# Patient Record
Sex: FEMALE
[De-identification: names, ages, dates, MRNs, and addresses within clinical notes are randomized per-mention and may not be internally consistent; named-entity substitution may affect disease eponyms.]

---

## 2022-12-28 ENCOUNTER — DASHBOARD ENCOUNTERS (OUTPATIENT)
Age: 42
End: 2022-12-28

## 2022-12-29 ENCOUNTER — OFFICE VISIT (OUTPATIENT)
Dept: URBAN - METROPOLITAN AREA CLINIC 84 | Facility: CLINIC | Age: 42
End: 2022-12-29

## 2022-12-29 RX ORDER — ONDANSETRON HYDROCHLORIDE 4 MG/1
TAKE 1 TABLET BY MOUTH EVERY 6 HOURS AS NEEDED TABLET, FILM COATED ORAL
Qty: 12 EACH | Refills: 0 | Status: ACTIVE | COMMUNITY

## 2022-12-29 RX ORDER — BLOOD-GLUCOSE METER
USE AS DIRECTED ONCE DAILY EACH MISCELLANEOUS
Qty: 1 EACH | Refills: 0 | Status: ACTIVE | COMMUNITY

## 2022-12-29 RX ORDER — CIPROFLOXACIN HYDROCHLORIDE 500 MG/1
TABLET, FILM COATED ORAL
Qty: 14 TABLET | Status: ACTIVE | COMMUNITY

## 2022-12-29 RX ORDER — INSULIN GLARGINE 100 [IU]/ML
INJECTION, SOLUTION SUBCUTANEOUS
Qty: 10 MILLILITER | Status: ACTIVE | COMMUNITY

## 2022-12-29 RX ORDER — LISINOPRIL 20 MG/1
TABLET ORAL
Qty: 30 TABLET | Status: ACTIVE | COMMUNITY

## 2022-12-29 RX ORDER — BLOOD SUGAR DIAGNOSTIC
USE AS DIRECTED ONCE DAILY STRIP MISCELLANEOUS
Qty: 50 EACH | Refills: 0 | Status: ACTIVE | COMMUNITY

## 2022-12-29 RX ORDER — LANCETS
USE AS DIRECTED ONCE DAILY EACH MISCELLANEOUS
Qty: 102 EACH | Refills: 0 | Status: ACTIVE | COMMUNITY

## 2024-12-02 ENCOUNTER — P2P PATIENT RECORD (OUTPATIENT)
Age: 44
End: 2024-12-02

## 2025-03-17 ENCOUNTER — OFFICE VISIT (OUTPATIENT)
Dept: URBAN - METROPOLITAN AREA CLINIC 27 | Facility: CLINIC | Age: 45
End: 2025-03-17
Payer: COMMERCIAL

## 2025-03-17 DIAGNOSIS — E11.9 TYPE 2 DIABETES MELLITUS WITHOUT COMPLICATION, WITHOUT LONG-TERM CURRENT USE OF INSULIN: ICD-10-CM

## 2025-03-17 DIAGNOSIS — Z12.11 COLON CANCER SCREENING: ICD-10-CM

## 2025-03-17 DIAGNOSIS — Z83.710 FAMILY HISTORY OF ADENOMATOUS POLYP OF COLON: ICD-10-CM

## 2025-03-17 DIAGNOSIS — K59.09 CHRONIC CONSTIPATION: ICD-10-CM

## 2025-03-17 PROBLEM — 236069009: Status: ACTIVE | Noted: 2025-03-17

## 2025-03-17 PROBLEM — 44054006: Status: ACTIVE | Noted: 2025-03-17

## 2025-03-17 PROBLEM — 305058001: Status: ACTIVE | Noted: 2025-03-17

## 2025-03-17 PROBLEM — 71691000112100: Status: ACTIVE | Noted: 2025-03-17

## 2025-03-17 PROCEDURE — 99204 OFFICE O/P NEW MOD 45 MIN: CPT | Performed by: PHYSICIAN ASSISTANT

## 2025-03-17 RX ORDER — LANCETS
USE AS DIRECTED ONCE DAILY EACH MISCELLANEOUS
Qty: 102 EACH | Refills: 0 | Status: ACTIVE | COMMUNITY

## 2025-03-17 RX ORDER — BLOOD-GLUCOSE METER
USE AS DIRECTED ONCE DAILY EACH MISCELLANEOUS
Qty: 1 EACH | Refills: 0 | Status: ACTIVE | COMMUNITY

## 2025-03-17 RX ORDER — CIPROFLOXACIN HYDROCHLORIDE 500 MG/1
TABLET, FILM COATED ORAL
Qty: 14 TABLET | Status: DISCONTINUED | COMMUNITY

## 2025-03-17 RX ORDER — LISINOPRIL 20 MG/1
TABLET ORAL
Qty: 30 TABLET | Status: ACTIVE | COMMUNITY

## 2025-03-17 RX ORDER — ONDANSETRON HYDROCHLORIDE 4 MG/1
TAKE 1 TABLET BY MOUTH EVERY 6 HOURS AS NEEDED TABLET, FILM COATED ORAL
Qty: 12 EACH | Refills: 0 | Status: DISCONTINUED | COMMUNITY

## 2025-03-17 RX ORDER — BLOOD SUGAR DIAGNOSTIC
USE AS DIRECTED ONCE DAILY STRIP MISCELLANEOUS
Qty: 50 EACH | Refills: 0 | Status: ACTIVE | COMMUNITY

## 2025-03-17 RX ORDER — INSULIN GLARGINE 100 [IU]/ML
INJECTION, SOLUTION SUBCUTANEOUS
Qty: 10 MILLILITER | Status: ACTIVE | COMMUNITY

## 2025-03-17 NOTE — HPI-TODAY'S VISIT:
Ms. Kwon is a 44 YOF new patient referred by Arslan Chiang for colonoscopy consultation with Dr. Hackett. No prior colonoscopy. She has chronic constipation, has a BM q3-4 days. No rectal bleeding. She is on Jardiance, metformin for T2DM. No h/o cardiopulmonary disease. . FH: no CRC, mother had colon polyps

## 2025-04-04 ENCOUNTER — OFFICE VISIT (OUTPATIENT)
Dept: URBAN - METROPOLITAN AREA SURGERY CENTER 7 | Facility: SURGERY CENTER | Age: 45
End: 2025-04-04